# Patient Record
Sex: MALE | Race: WHITE
[De-identification: names, ages, dates, MRNs, and addresses within clinical notes are randomized per-mention and may not be internally consistent; named-entity substitution may affect disease eponyms.]

---

## 2019-12-05 ENCOUNTER — HOSPITAL ENCOUNTER (EMERGENCY)
Dept: HOSPITAL 75 - ER FS | Age: 24
Discharge: HOME | End: 2019-12-05
Payer: COMMERCIAL

## 2019-12-05 VITALS — DIASTOLIC BLOOD PRESSURE: 85 MMHG | SYSTOLIC BLOOD PRESSURE: 142 MMHG

## 2019-12-05 VITALS — WEIGHT: 315 LBS | HEIGHT: 70 IN | BODY MASS INDEX: 45.1 KG/M2

## 2019-12-05 DIAGNOSIS — Z90.89: ICD-10-CM

## 2019-12-05 DIAGNOSIS — K59.00: Primary | ICD-10-CM

## 2019-12-05 PROCEDURE — 74022 RADEX COMPL AQT ABD SERIES: CPT

## 2019-12-05 NOTE — ED GI
General


Chief Complaint:  Abdominal/GI Problems


Stated Complaint:  COMSTIPATION


Nursing Triage Note:  


Patient reports he has been constipated for 9-10 days, states he has taken mag 


citrate, dulcolax, and 2 tap water enemas at home without good results. He 


states he went to walk-in care two days ago and was told to take miralax, so he 


took the max daily dose for last two days without good results. He states his 


stools are clear liquid. He reports he is still eating and drinking normally, 


denies any nausea/vomiting, states some mild intermittent abdominal and back 


pain. States he was told by walk-in care to come to the ED if he had not had a 


bowel movement in 48 hours.


Sepsis Screen:  No Definite Risk


Source of Information:  Patient





History of Present Illness


Date Seen by Provider:  Dec 5, 2019


Time Seen by Provider:  13:37


Initial Comments


24-year-old male presenting with complaints that he has not had a good bowel 

movement for at least 9-10 days. He denies having any abdominal pain or nausea 

or vomiting. He also denies any change in his diet. He was having some right 

posterior flank pain and there some concern he might have a urinary tract 

infection but when he was seen 2 days ago in the urgent care his urine was 

clear. They did an x-ray and told him that he had a large ball of stool and to 

aggressively take MiraLAX until clear. He had been taking several doses of 

MiraLAX and was only getting watery liquid out. He denied any large amount of 

stool coming out. He was continuing to eat and drink without any abdominal pain 

or nausea or vomiting. His right flank pain was resolved after starting the 

MiraLAX. However he was told if he didn't have a large amount of stool come out 

by Thursday morning to come to the emergency department.





Allergies and Home Medications


Allergies


Coded Allergies:  


     No Known Drug Allergies (Unverified , 19)





Patient Home Medication List


Home Medication List Reviewed:  Yes





Review of Systems


Review of Systems


Constitutional:  No chills, No dizziness, No fever, No malaise


EENTM:  No Symptoms Reported


Respiratory:  No Symptoms Reported


Cardiovascular:  No Symptoms Reported


Gastrointestinal:  See HPI, Abdomen Distended (since taken the MiraLAX and 

multiple laxatives he has had increased bloating and gas); Denies Abdominal 

Pain, Denies Blood Streaked Stools; Constipated; Denies Difficulty Swallowing, 

Denies Nausea, Denies Poor Appetite, Denies Poor Fluid Intake, Denies Rectal 

Bleeding, Denies Vomiting


Genitourinary:  No Symptoms Reported


Musculoskeletal:  no symptoms reported


Skin:  no symptoms reported


Psychiatric/Neurological:  No Symptoms Reported


Endocrine:  No Symptoms Reported





Past Medical-Social-Family Hx


Past Med/Social Hx:  Reviewed Nursing Past Med/Soc Hx


Patient Social History


Alcohol Use:  Denies Use


Recreational Drug Use:  No


Smoking Status:  Never a Smoker


2nd Hand Smoke Exposure:  No


Recent Foreign Travel:  No


Contact w/Someone Who Travel:  No


Recent Infectious Disease Expo:  No


Recent Hopitalizations:  No


Physical Abuse:  No


Sexual Abuse:  No


Mistreated:  No


Fear:  No





Seasonal Allergies


Seasonal Allergies:  No





Past Medical History


Surgeries:  Yes


Adenoidectomy, Tonsillectomy


Respiratory:  No


Cardiac:  No


Neurological:  No


Genitourinary:  No


Gastrointestinal:  No


Musculoskeletal:  No


Endocrine:  No


HEENT:  No


Cancer:  No


Psychosocial:  No


Integumentary:  No





Physical Exam


Vital Signs





Vital Signs - First Documented








 19





 12:16


 


Temp 36.7


 


Pulse 95


 


Resp 16


 


B/P (MAP) 183/114 (137)


 


Pulse Ox 96


 


O2 Delivery Room Air





Capillary Refill : Less Than 3 Seconds


Height/Weight/BMI


Height: '"


Weight: lbs. oz. kg; 47.00 BMI


Method:


General Appearance:  WD/WN, no apparent distress, obese


HEENT:  PERRL/EOMI, normal ENT inspection, pharynx normal


Neck:  non-tender, full range of motion, supple


Respiratory:  chest non-tender, lungs clear, normal breath sounds, no 

respiratory distress, no accessory muscle use


Cardiovascular:  normal peripheral pulses, regular rate, rhythm


Gastrointestinal:  non tender, soft, no pulsatile mass, abnormal bowel sounds 

(hyperactive)


Rectal:  deferred


Extremities:  normal range of motion, non-tender, normal capillary refill


Back:  no CVA tenderness


Neurologic/Psychiatric:  alert, normal mood/affect, oriented x 3


Skin:  normal color, warm/dry





Progress/Results/Core Measures


Results/Orders


My Orders





Orders - MARISA PACHECO MD


Acute Abd Series (19 12:21)





Vital Signs/I&O











 19





 12:16 14:30


 


Temp 36.7 


 


Pulse 95 77


 


Resp 16 18


 


B/P (MAP) 183/114 (137) 142/85


 


Pulse Ox 96 98


 


O2 Delivery Room Air Room Air














Blood Pressure Mean:                    137


POS








Progress


Progress Note :  


Progress Note


Acute abdomen series was obtained prior to my evaluation of the patient. This 

did not show any obstructive pattern or significant amount of stool. He did have

increased stool on the right: And had a nonspecific gas pattern. There is no 

sign of obstruction or free air. I discussed with him and his wife about other 

testing such as blood work and CT scan however with him not having any pain and 

no nausea or vomiting this likely would not show anything significant right now.

He could certainly have this done as an outpatient and get established with 

clinic if he continued to have issues. Since he was not having pain in his exam 

was benign otherwise he opted to wait and continue with fluids, increase fiber 

in his diet, and daily MiraLAX. Advised to check through the clinic for 

establishing primary care.


Advised if he started having fever, abdominal pain, nausea and vomiting that he 

should return for CT scan and more aggressive evaluation





Diagnostic Imaging





   Diagonstic Imaging:  Xray


   Plain Films/CT/US/NM/MRI:  abdomen


Comments


NAME:   DEEPAK BURCH


[facility] REC#:   [id number]


ACCOUNT#:   [id number]


PT STATUS:   REG ER


:   1995


PHYSICIAN:   MARISA PACHECO MD


ADMIT DATE:   19/ER FS


                                   ***Draft***


POSDate of Exam:19





ACUTE ABD SERIES








INDICATION: Abdominal pain.





TIME OF EXAM: 12:07 PM





The heart size is normal. Lungs are clear. No free air is


detected. The bowel gas pattern is nonobstructive. No pathologic


calcifications are seen.





IMPRESSION: No acute abnormality is detected.





  Dictated on workstation # [id number]








Dict:   19 1239


Trans:   19 1240


CV [id number]





Interpreted by:     SEBASTIAN LOPEZ MD


Electronically signed by:





Departure


Impression





   Primary Impression:  


   Constipation


   Qualified Codes:  K59.00 - Constipation, unspecified


Disposition:   HOME, SELF-CARE


Condition:  Stable





Departure-Patient Inst.


Decision time for Depature:  14:15


Referrals:  


ANTONIO CERVANTES MD (PCP/Family)


Primary Care Physician








[facility]


Patient Instructions:  Constipation, Adult (DC), High Fiber Diet





Add. Discharge Instructions:  


Increase the fiber in your diet. Make sure you are drinking plenty of water and 

electrolyte drinks. 


Try eating small frequent meals instead of just 1 big meal a day.





If you have sharp abdominal pain, nausea with vomiting, or fever then return or 

seek medical care for further evaluation such as a CT scan of your abdomen. 





Establish care with a primary provider through the clinic for outpatient testing

and follow up for your bowels.





All discharge instructions reviewed with patient and/or family. Voiced 

understanding.


Work/School Note:  Work Release Form   Date Seen in the Emergency Department:  

Dec 5, 2019


   Return to Work:  Dec 6, 2019


   Restrictions:  No Restrictions











MARISA PACHECO MD                Dec 5, 2019 13:37


POS

## 2019-12-05 NOTE — DIAGNOSTIC IMAGING REPORT
INDICATION: Abdominal pain.



TIME OF EXAM: 12:07 PM



The heart size is normal. Lungs are clear. No free air is

detected. The bowel gas pattern is nonobstructive. No pathologic

calcifications are seen.



IMPRESSION: No acute abnormality is detected.



Dictated by: 



  Dictated on workstation # [id number]

## 2019-12-31 NOTE — XMS REPORT
Continuity of Care Document

                             Created on: 2019



Jesse Starr

External Reference #: [id number]

: 1995

Sex: Male



Demographics





                          Address                   [address] [place] [address]  [id number]

 

                          Home Phone                [phone] x

 

                          Preferred Language        Unknown

 

                          Marital Status            Unknown

 

                          Buddhist Affiliation     Unknown

 

                          Race                      Unknown

 

                          Ethnic Group              Unknown





Author





                          Organization              Unknown

 

                          Address                   Unknown

 

                          Phone                     Unavailable



              



Allergies

      



             Active           Description           Code           Type         

  Severity   

                Reaction           Onset           Reported/Identified          

 

Relationship to Patient                 Clinical Status        

 

                Yes             No Known Drug Allergies           [id number]    

       Drug 

Allergy           Unknown           N/A                             2019  

      

                                                             



                  



Medications

      



There is no data.                  



Problems

      



             Date Dx Coded           Attending           Type           Code    

       

Diagnosis                               Diagnosed By        

 

             2019           JUNIOR PEREZ, MARISA WEISS Ot           K59.0

0           

CONSTIPATION, UNSPECIFIED                        

 

             2019           JUNIOR PEREZ, MARISA WEISS           Ot           Z90.8

9           

ACQUIRED ABSENCE OF OTHER ORGANS                    



                    



Procedures

      



There is no data.                  



Results

      



There is no data.              



Encounters

      



                ACCT No.           Visit Date/Time           Discharge          

 Status         

             Pt. Type           Provider           Facility           Loc./Unit 

          

Complaint        

 

                [id number]           10/08/2019 07:00:00           10/08/2019 23:59:5

9           [facility] 

                [facility]                                           [facility] [place] 

[facility]                                             

 

                    [id number]           2019 12:12:00           

019 14:30:00        

                DIS             Outpatient           MARISA PACHECO MD          

 [facility]           ER FS                     COMSTIPATION